# Patient Record
Sex: MALE | Race: WHITE
[De-identification: names, ages, dates, MRNs, and addresses within clinical notes are randomized per-mention and may not be internally consistent; named-entity substitution may affect disease eponyms.]

---

## 2018-12-14 ENCOUNTER — HOSPITAL ENCOUNTER (OUTPATIENT)
Dept: HOSPITAL 89 - US | Age: 48
End: 2018-12-14
Attending: PHYSICIAN ASSISTANT
Payer: COMMERCIAL

## 2018-12-14 DIAGNOSIS — R94.5: Primary | ICD-10-CM

## 2018-12-14 DIAGNOSIS — R16.0: ICD-10-CM

## 2018-12-14 PROCEDURE — 76705 ECHO EXAM OF ABDOMEN: CPT

## 2018-12-14 NOTE — RADIOLOGY IMAGING REPORT
FACILITY: [facility] 

 

PATIENT NAME: Dave Ratliff

: 1970

MR: [id number]

V: [id number]

EXAM DATE: 

ORDERING PHYSICIAN: CONNOR ASTUDILLO

TECHNOLOGIST: 

 

Location: [facility] [place]

Patient: Dave Ratliff

: 1970

MRN: [id number]

Visit/Account:[id number]

Date of Sevice: 2018

 

ACCESSION #: [id number]

 

LIVER

 

HISTORY:  Elevated LFTs

 

COMPARISON:  None.

 

FINDINGS:

 

Gallbladder: Unremarkable; no stones or sludge.

 

Liver: Liver appears very heterogeneous.  In the left lobe there is a slightly irregular 1.9 x 1.3 x 
1.9 cm hypoechoic nodule

 

Common duct: Normal, 4.9 mm diameter.

 

Pancreas: Partially obscured by bowel, visualized aspects unremarkable.

 

Right kidney: There is a mild lobular contour to the right kidney although no evidence of hydronephro
sis.  The right kidney measures 11.2 cm in length

 

Upper abdominal aorta and IVC: Patent.

 

Ascites: None visualized.

 

IMPRESSION:

The liver appears very heterogeneous.  There is an irregular hypoechoic mass measuring 1.9 cm in diam
eter and the left lobe.  Further evaluation with CT or MR of the liver with and without contrast radha
mmended

 

Report Dictated By: Hien Candelario MD at 2018 10:28 AM

 

Report E-Signed By: Hien Candelario MD  at 2018 11:57 AM

 

WSN:[id number]

## 2018-12-21 ENCOUNTER — HOSPITAL ENCOUNTER (OUTPATIENT)
Dept: HOSPITAL 89 - MRI | Age: 48
End: 2018-12-21
Attending: PHYSICIAN ASSISTANT
Payer: COMMERCIAL

## 2018-12-21 DIAGNOSIS — K76.0: ICD-10-CM

## 2018-12-21 DIAGNOSIS — D37.6: Primary | ICD-10-CM

## 2018-12-21 PROCEDURE — 74183 MRI ABD W/O CNTR FLWD CNTR: CPT

## 2018-12-21 NOTE — RADIOLOGY IMAGING REPORT
FACILITY: [facility] 

 

PATIENT NAME: Dave Ratliff

: 1970

MR: [id number]

V: [id number]

EXAM DATE: 

ORDERING PHYSICIAN: CONNOR ASTUDILLO

TECHNOLOGIST: 

 

Location: [facility]

Patient: Dave Ratliff

: 1970

MRN: [id number]

Visit/Account:[id number]

Date of Sevice: 2018

 

ACCESSION #: [id number]

 

ABDOMEN W W/O CONTRAST

 

HISTORY:  Abnormal ultrasound. Hepatic mass.

 

ADDITIONAL HISTORY:  None.

 

TECHNIQUE:  Multiplanar multisequence magnetic resonance imaging of the abdomen without and with intr
avenous contrast.

 

CONTRAST:  20 cc of MultiHance

 

COMPARISON:  Abdominal ultrasound 2018

 

FINDINGS:

 

Liver: Diffuse hepatic steatosis with mild areas of fatty sparing. There are 7 liver lesions measurin
g up to 1.5 cm. Several of these lesions do not enhance and are consistent with simple cysts. 3 of th
e lesions enhance (series 17 image 25, 38) and are indeterminate.

Gallbladder and bile ducts: Negative. The common bile duct measures 4 mm.

Spleen: Negative.

Adrenal glands: Negative.

Pancreas: Negative.

Kidneys: 1.8 x 1.2 cm left renal cyst. No hydronephrosis.

Vessels: Normal

Bowel/peritoneum/mesentery: Negative

Lymph nodes: Negative

Bones/soft tissues: Negative

Visualized lung bases: Negative

Visualized pelvis: Negative

Other findings: None significant

 

IMPRESSION:

 

1. Diffuse hepatic steatosis with mild areas of fatty sparing. There are 7 liver lesions measuring up
 to 1.5 cm. Many of these lesions are simple cysts. 3 of these lesions enhance although enhancement p
attern is difficult to classify. These could be hemangiomas although MRI features are nonspecific. Wi
thout a history of malignancy these lesions are likely benign. Consider follow-up in 6 months if ther
e is high clinical concern.

 

 

Report Dictated By: Aiden Florence MD at 2018 5:21 PM

 

Report E-Signed By: Aiden Florence MD  at 2018 5:30 PM

 

WSN:[id number]